# Patient Record
Sex: MALE | Race: WHITE | HISPANIC OR LATINO
[De-identification: names, ages, dates, MRNs, and addresses within clinical notes are randomized per-mention and may not be internally consistent; named-entity substitution may affect disease eponyms.]

---

## 2017-03-11 ENCOUNTER — TRANSCRIPTION ENCOUNTER (OUTPATIENT)
Age: 57
End: 2017-03-11

## 2017-03-12 ENCOUNTER — TRANSCRIPTION ENCOUNTER (OUTPATIENT)
Age: 57
End: 2017-03-12

## 2017-05-28 ENCOUNTER — TRANSCRIPTION ENCOUNTER (OUTPATIENT)
Age: 57
End: 2017-05-28

## 2017-10-06 ENCOUNTER — TRANSCRIPTION ENCOUNTER (OUTPATIENT)
Age: 57
End: 2017-10-06

## 2018-01-17 ENCOUNTER — TRANSCRIPTION ENCOUNTER (OUTPATIENT)
Age: 58
End: 2018-01-17

## 2019-11-21 ENCOUNTER — TRANSCRIPTION ENCOUNTER (OUTPATIENT)
Age: 59
End: 2019-11-21

## 2022-05-06 ENCOUNTER — NON-APPOINTMENT (OUTPATIENT)
Age: 62
End: 2022-05-06

## 2022-11-16 ENCOUNTER — OFFICE (OUTPATIENT)
Dept: URBAN - METROPOLITAN AREA CLINIC 109 | Facility: CLINIC | Age: 62
Setting detail: OPHTHALMOLOGY
End: 2022-11-16
Payer: COMMERCIAL

## 2022-11-16 DIAGNOSIS — H02.834: ICD-10-CM

## 2022-11-16 DIAGNOSIS — E11.9: ICD-10-CM

## 2022-11-16 DIAGNOSIS — H01.005: ICD-10-CM

## 2022-11-16 DIAGNOSIS — H01.004: ICD-10-CM

## 2022-11-16 DIAGNOSIS — H01.001: ICD-10-CM

## 2022-11-16 DIAGNOSIS — H02.832: ICD-10-CM

## 2022-11-16 DIAGNOSIS — H25.13: ICD-10-CM

## 2022-11-16 DIAGNOSIS — H01.002: ICD-10-CM

## 2022-11-16 DIAGNOSIS — H02.835: ICD-10-CM

## 2022-11-16 DIAGNOSIS — H40.033: ICD-10-CM

## 2022-11-16 DIAGNOSIS — H02.831: ICD-10-CM

## 2022-11-16 DIAGNOSIS — H16.222: ICD-10-CM

## 2022-11-16 PROCEDURE — 92020 GONIOSCOPY: CPT | Performed by: OPHTHALMOLOGY

## 2022-11-16 PROCEDURE — 92014 COMPRE OPH EXAM EST PT 1/>: CPT | Performed by: OPHTHALMOLOGY

## 2022-11-16 ASSESSMENT — TONOMETRY
OD_IOP_MMHG: 19
OS_IOP_MMHG: 18

## 2022-11-16 ASSESSMENT — REFRACTION_CURRENTRX
OD_CYLINDER: -0.25
OS_AXIS: 158
OS_CYLINDER: -0.25
OD_ADD: +2.25
OS_ADD: +2.25
OS_OVR_VA: 20/
OD_OVR_VA: 20/
OD_AXIS: 29
OD_SPHERE: +1.75
OS_VPRISM_DIRECTION: PROGS
OD_VPRISM_DIRECTION: PROGS
OS_SPHERE: +1.75

## 2022-11-16 ASSESSMENT — REFRACTION_AUTOREFRACTION
OS_SPHERE: +2.00
OS_AXIS: 174
OD_CYLINDER: -0.25
OD_AXIS: 151
OS_CYLINDER: -0.25
OD_SPHERE: +2.00

## 2022-11-16 ASSESSMENT — LID EXAM ASSESSMENTS
OD_DERMATOCHALASIS: 1+
OS_BLEPHARITIS: LLL LUL T
OS_DERMATOCHALASIS: 1+
OD_BLEPHARITIS: RLL RUL T

## 2022-11-16 ASSESSMENT — KERATOMETRY
OD_K2POWER_DIOPTERS: 43.00
OD_K1POWER_DIOPTERS: 42.50
OS_K1POWER_DIOPTERS: 42.75
OD_AXISANGLE_DEGREES: 073
OS_K2POWER_DIOPTERS: 43.25
OS_AXISANGLE_DEGREES: 093

## 2022-11-16 ASSESSMENT — SPHEQUIV_DERIVED
OD_SPHEQUIV: 1.875
OS_SPHEQUIV: 1.875

## 2022-11-16 ASSESSMENT — REFRACTION_MANIFEST
OD_SPHERE: +2.00
OS_SPHERE: +1.75
OD_ADD: +2.50
OS_ADD: +2.50
OS_VA1: 2020
OD_VA1: 20/20

## 2022-11-16 ASSESSMENT — VISUAL ACUITY
OD_BCVA: 20/20
OS_BCVA: 20/30

## 2022-11-16 ASSESSMENT — AXIALLENGTH_DERIVED
OS_AL: 23.0574
OD_AL: 23.1455

## 2022-11-16 ASSESSMENT — CONFRONTATIONAL VISUAL FIELD TEST (CVF)
OS_FINDINGS: FULL
OD_FINDINGS: FULL

## 2022-11-16 ASSESSMENT — SUPERFICIAL PUNCTATE KERATITIS (SPK): OS_SPK: 1+

## 2022-12-07 ENCOUNTER — OFFICE (OUTPATIENT)
Dept: URBAN - METROPOLITAN AREA CLINIC 109 | Facility: CLINIC | Age: 62
Setting detail: OPHTHALMOLOGY
End: 2022-12-07
Payer: COMMERCIAL

## 2022-12-07 DIAGNOSIS — H02.831: ICD-10-CM

## 2022-12-07 DIAGNOSIS — H01.004: ICD-10-CM

## 2022-12-07 DIAGNOSIS — H01.001: ICD-10-CM

## 2022-12-07 DIAGNOSIS — H02.832: ICD-10-CM

## 2022-12-07 DIAGNOSIS — H16.222: ICD-10-CM

## 2022-12-07 DIAGNOSIS — H25.13: ICD-10-CM

## 2022-12-07 DIAGNOSIS — H01.005: ICD-10-CM

## 2022-12-07 DIAGNOSIS — H01.002: ICD-10-CM

## 2022-12-07 DIAGNOSIS — E11.9: ICD-10-CM

## 2022-12-07 DIAGNOSIS — H40.033: ICD-10-CM

## 2022-12-07 DIAGNOSIS — H02.835: ICD-10-CM

## 2022-12-07 DIAGNOSIS — H02.834: ICD-10-CM

## 2022-12-07 PROBLEM — H57.12: Status: ACTIVE | Noted: 2022-11-16

## 2022-12-07 PROCEDURE — 99213 OFFICE O/P EST LOW 20 MIN: CPT | Performed by: OPHTHALMOLOGY

## 2022-12-07 ASSESSMENT — REFRACTION_AUTOREFRACTION
OS_AXIS: 174
OS_SPHERE: +2.00
OD_AXIS: 151
OS_CYLINDER: -0.25
OD_SPHERE: +2.00
OD_CYLINDER: -0.25

## 2022-12-07 ASSESSMENT — REFRACTION_MANIFEST
OS_SPHERE: +1.75
OD_VA1: 20/20
OD_SPHERE: +2.00
OD_ADD: +2.50
OS_VA1: 20/20
OS_ADD: +2.50

## 2022-12-07 ASSESSMENT — LID EXAM ASSESSMENTS
OD_BLEPHARITIS: RLL RUL T
OS_DERMATOCHALASIS: 1+
OD_DERMATOCHALASIS: 1+
OS_BLEPHARITIS: LLL LUL T

## 2022-12-07 ASSESSMENT — REFRACTION_CURRENTRX
OD_CYLINDER: -0.25
OS_VPRISM_DIRECTION: PROGS
OS_ADD: +2.25
OS_CYLINDER: -0.25
OS_OVR_VA: 20/
OD_VPRISM_DIRECTION: PROGS
OD_OVR_VA: 20/
OS_AXIS: 158
OD_ADD: +2.25
OD_SPHERE: +1.75
OD_AXIS: 29
OS_SPHERE: +1.75

## 2022-12-07 ASSESSMENT — KERATOMETRY
OD_K2POWER_DIOPTERS: 43.00
OS_K2POWER_DIOPTERS: 43.25
OS_K1POWER_DIOPTERS: 42.75
OS_AXISANGLE_DEGREES: 093
OD_AXISANGLE_DEGREES: 073
OD_K1POWER_DIOPTERS: 42.50

## 2022-12-07 ASSESSMENT — CONFRONTATIONAL VISUAL FIELD TEST (CVF)
OS_FINDINGS: FULL
OD_FINDINGS: FULL

## 2022-12-07 ASSESSMENT — SPHEQUIV_DERIVED
OD_SPHEQUIV: 1.875
OS_SPHEQUIV: 1.875

## 2022-12-07 ASSESSMENT — AXIALLENGTH_DERIVED
OD_AL: 23.1455
OS_AL: 23.0574

## 2022-12-07 ASSESSMENT — TONOMETRY
OD_IOP_MMHG: 17
OS_IOP_MMHG: 17

## 2022-12-07 ASSESSMENT — SUPERFICIAL PUNCTATE KERATITIS (SPK): OS_SPK: 1+

## 2022-12-07 ASSESSMENT — VISUAL ACUITY
OS_BCVA: 20/30
OD_BCVA: 20/20

## 2023-03-23 ENCOUNTER — NON-APPOINTMENT (OUTPATIENT)
Age: 63
End: 2023-03-23

## 2023-03-30 ENCOUNTER — NON-APPOINTMENT (OUTPATIENT)
Age: 63
End: 2023-03-30

## 2023-04-04 ENCOUNTER — APPOINTMENT (OUTPATIENT)
Dept: ORTHOPEDIC SURGERY | Facility: CLINIC | Age: 63
End: 2023-04-04
Payer: COMMERCIAL

## 2023-04-04 PROCEDURE — 73030 X-RAY EXAM OF SHOULDER: CPT | Mod: RT

## 2023-04-04 PROCEDURE — 99203 OFFICE O/P NEW LOW 30 MIN: CPT

## 2023-04-04 NOTE — PHYSICAL EXAM
[de-identified] : General Exam\par \par Well developed, well nourished\par No apparent distress\par Oriented to person, place, and time\par Mood: Normal\par Affect: Normal\par Balance and coordination: Normal\par Gait: Normal\par \par Right shoulder exam\par \par Inspection: No swelling, ecchymosis or gross deformity.\par Skin: No masses, No lesions\par Tenderness: No bicipital tenderness, no tenderness to the greater tuberosity/RTC insertion, no anterior shoulder/lesser tuberosity tenderness. No tenderness SC joint, clavicle, AC joint.\par ROM: 160/60/T6\par Impingement tests: Positive Rodríguez\par AC Joint: no pain with cross arm testing\par Biceps: Negative speed\par Strength: 5-/5 abduction, 5/5 external rotation, and internal rotation\par Neuro: AIN, PIN, Ulnar nerve motor intact\par Sensation: Intact to light touch in radial, median, ulnar, and axillary nerve distributions\par Vasc: 2+ radial pulse\par  [de-identified] : \par The following radiographs were ordered and read by me during this patients visit. I reviewed each radiograph in detail with the patient and discussed the findings as highlighted below. \par \par 3 views of the right shoulder were obtained today.  The glenohumeral joint is well-maintained.  There is normal alignment there is no fracture
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
## 2023-04-04 NOTE — DISCUSSION/SUMMARY
[de-identified] : Longstanding right shoulder pain and weakness.  Has had extensive conservative care he continues to experience pain and weakness.  An MRI in the past several years ago showed a rotator cuff tear.  He has tried physical therapy anti-inflammatory medications time activity modification without relief.  He has weakness with rotator cuff testing today.  We will obtain an MRI to further evaluate and he will follow-up after MRI

## 2023-04-27 ENCOUNTER — NON-APPOINTMENT (OUTPATIENT)
Age: 63
End: 2023-04-27

## 2023-05-05 ENCOUNTER — APPOINTMENT (OUTPATIENT)
Dept: ORTHOPEDIC SURGERY | Facility: CLINIC | Age: 63
End: 2023-05-05
Payer: COMMERCIAL

## 2023-05-05 VITALS — WEIGHT: 200 LBS | BODY MASS INDEX: 33.32 KG/M2 | HEIGHT: 65 IN

## 2023-05-05 PROCEDURE — 99214 OFFICE O/P EST MOD 30 MIN: CPT

## 2023-05-05 NOTE — PHYSICAL EXAM
[de-identified] : Right shoulder exam\par \par Inspection: No swelling, ecchymosis or gross deformity.\par Skin: No masses, No lesions\par Tenderness: No bicipital tenderness, no tenderness to the greater tuberosity/RTC insertion, no anterior shoulder/lesser tuberosity tenderness. No tenderness SC joint, clavicle, AC joint.\par ROM: 160/60/T6\par Impingement tests: Positive Rodríguez\par AC Joint: no pain with cross arm testing\par Biceps: Negative speed\par Strength: 5-/5 abduction, 5/5 external rotation, and 5-/5 internal rotation\par Neuro: AIN, PIN, Ulnar nerve motor intact\par Sensation: Intact to light touch in radial, median, ulnar, and axillary nerve distributions\par Vasc: 2+ radial pulse\par  [de-identified] : MRI reviewed right shoulder.  There is supraspinatus tendinosis with a calcific deposit in the infraspinatus.  There is ossification within the subscapularis with possible prior avulsion.  There is biceps tenosynovitis there is a large subacromial spur
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
## 2023-05-05 NOTE — DISCUSSION/SUMMARY
[de-identified] : 63-year-old male continued right shoulder pain and weakness pain is extensive nonoperative care he continues to experience pain and weakness.  We discussed continued nonoperative care with activity modification physical therapy medications injections.  We discussed surgical treatment.  Right shoulder arthroscopy subacromial decompression rotator cuff repair and possible biceps tenodesis.  Risks benefits alternatives discussed at length including but not limited to risks such as bleeding infection nerve or vessel injury continued pain stiffness retear need for future surgery medical complications such as DVT or PE and anesthesia complications.  All questions answered he will schedule at his convenience

## 2023-05-12 ENCOUNTER — TRANSCRIPTION ENCOUNTER (OUTPATIENT)
Age: 63
End: 2023-05-12

## 2023-05-12 ENCOUNTER — OUTPATIENT (OUTPATIENT)
Dept: OUTPATIENT SERVICES | Facility: HOSPITAL | Age: 63
LOS: 1 days | Discharge: HOME | End: 2023-05-12
Payer: COMMERCIAL

## 2023-05-12 VITALS
OXYGEN SATURATION: 98 % | TEMPERATURE: 98 F | HEIGHT: 65 IN | HEART RATE: 85 BPM | DIASTOLIC BLOOD PRESSURE: 69 MMHG | WEIGHT: 201.5 LBS | RESPIRATION RATE: 18 BRPM | SYSTOLIC BLOOD PRESSURE: 129 MMHG

## 2023-05-12 DIAGNOSIS — I10 ESSENTIAL (PRIMARY) HYPERTENSION: ICD-10-CM

## 2023-05-12 DIAGNOSIS — M75.121 COMPLETE ROTATOR CUFF TEAR OR RUPTURE OF RIGHT SHOULDER, NOT SPECIFIED AS TRAUMATIC: ICD-10-CM

## 2023-05-12 DIAGNOSIS — Z98.890 OTHER SPECIFIED POSTPROCEDURAL STATES: Chronic | ICD-10-CM

## 2023-05-12 DIAGNOSIS — Z98.52 VASECTOMY STATUS: Chronic | ICD-10-CM

## 2023-05-12 DIAGNOSIS — Z01.818 ENCOUNTER FOR OTHER PREPROCEDURAL EXAMINATION: ICD-10-CM

## 2023-05-12 DIAGNOSIS — E11.9 TYPE 2 DIABETES MELLITUS WITHOUT COMPLICATIONS: ICD-10-CM

## 2023-05-12 LAB
ANION GAP SERPL CALC-SCNC: 5 MMOL/L — SIGNIFICANT CHANGE UP (ref 5–17)
BASOPHILS # BLD AUTO: 0.01 K/UL — SIGNIFICANT CHANGE UP (ref 0–0.2)
BASOPHILS NFR BLD AUTO: 0.2 % — SIGNIFICANT CHANGE UP (ref 0–2)
BUN SERPL-MCNC: 17 MG/DL — SIGNIFICANT CHANGE UP (ref 7–23)
CALCIUM SERPL-MCNC: 9.4 MG/DL — SIGNIFICANT CHANGE UP (ref 8.5–10.1)
CHLORIDE SERPL-SCNC: 106 MMOL/L — SIGNIFICANT CHANGE UP (ref 96–108)
CO2 SERPL-SCNC: 26 MMOL/L — SIGNIFICANT CHANGE UP (ref 22–31)
CREAT SERPL-MCNC: 0.73 MG/DL — SIGNIFICANT CHANGE UP (ref 0.5–1.3)
EGFR: 104 ML/MIN/1.73M2 — SIGNIFICANT CHANGE UP
EOSINOPHIL # BLD AUTO: 0.02 K/UL — SIGNIFICANT CHANGE UP (ref 0–0.5)
EOSINOPHIL NFR BLD AUTO: 0.3 % — SIGNIFICANT CHANGE UP (ref 0–6)
GLUCOSE SERPL-MCNC: 193 MG/DL — HIGH (ref 70–99)
HCT VFR BLD CALC: 47.6 % — SIGNIFICANT CHANGE UP (ref 39–50)
HGB BLD-MCNC: 16.1 G/DL — SIGNIFICANT CHANGE UP (ref 13–17)
IMM GRANULOCYTES NFR BLD AUTO: 0.3 % — SIGNIFICANT CHANGE UP (ref 0–0.9)
LYMPHOCYTES # BLD AUTO: 2.5 K/UL — SIGNIFICANT CHANGE UP (ref 1–3.3)
LYMPHOCYTES # BLD AUTO: 37.9 % — SIGNIFICANT CHANGE UP (ref 13–44)
MCHC RBC-ENTMCNC: 31 PG — SIGNIFICANT CHANGE UP (ref 27–34)
MCHC RBC-ENTMCNC: 33.8 G/DL — SIGNIFICANT CHANGE UP (ref 32–36)
MCV RBC AUTO: 91.7 FL — SIGNIFICANT CHANGE UP (ref 80–100)
MONOCYTES # BLD AUTO: 0.47 K/UL — SIGNIFICANT CHANGE UP (ref 0–0.9)
MONOCYTES NFR BLD AUTO: 7.1 % — SIGNIFICANT CHANGE UP (ref 2–14)
NEUTROPHILS # BLD AUTO: 3.57 K/UL — SIGNIFICANT CHANGE UP (ref 1.8–7.4)
NEUTROPHILS NFR BLD AUTO: 54.2 % — SIGNIFICANT CHANGE UP (ref 43–77)
NRBC # BLD: 0 /100 WBCS — SIGNIFICANT CHANGE UP (ref 0–0)
PLATELET # BLD AUTO: 201 K/UL — SIGNIFICANT CHANGE UP (ref 150–400)
POTASSIUM SERPL-MCNC: 4.3 MMOL/L — SIGNIFICANT CHANGE UP (ref 3.5–5.3)
POTASSIUM SERPL-SCNC: 4.3 MMOL/L — SIGNIFICANT CHANGE UP (ref 3.5–5.3)
RBC # BLD: 5.19 M/UL — SIGNIFICANT CHANGE UP (ref 4.2–5.8)
RBC # FLD: 11.9 % — SIGNIFICANT CHANGE UP (ref 10.3–14.5)
SODIUM SERPL-SCNC: 137 MMOL/L — SIGNIFICANT CHANGE UP (ref 135–145)
WBC # BLD: 6.59 K/UL — SIGNIFICANT CHANGE UP (ref 3.8–10.5)
WBC # FLD AUTO: 6.59 K/UL — SIGNIFICANT CHANGE UP (ref 3.8–10.5)

## 2023-05-12 PROCEDURE — 93010 ELECTROCARDIOGRAM REPORT: CPT

## 2023-05-12 NOTE — H&P PST ADULT - HISTORY OF PRESENT ILLNESS
62yo male with medical h/o T2DM and HTN, reports Shoulder pain r/t rotator cuff tear. Pt presents today for PST for scheduled Right Shoulder Arthroscopy on 5/22/2023

## 2023-05-12 NOTE — H&P PST ADULT - PROBLEM SELECTOR PLAN 1
Pre-op instructions given. Pt verbalized understanding  Chlorhexidine wash instructions given  Pt instructed to hold all NSAIDs + herbal supplements/vitamins 7 days before surgery  HOLD all diabetic meds + Accu-Chek ordered STAT for day before surgery  Pending: Labs + M/C for abnormal ecg

## 2023-05-12 NOTE — H&P PST ADULT - NSANTHOSAYNRD_GEN_A_CORE
No. KAMRON screening performed.  STOP BANG Legend: 0-2 = LOW Risk; 3-4 = INTERMEDIATE Risk; 5-8 = HIGH Risk

## 2023-05-12 NOTE — H&P PST ADULT - PROBLEM SELECTOR PLAN 3
HOLD all diabetic meds + Accu-Chek ordered STAT for day before surgery  STOP Jardiance 5/17/2023, restart after surgery

## 2023-05-12 NOTE — H&P PST ADULT - NEGATIVE MUSCULOSKELETAL SYMPTOMS
no arthritis/no joint swelling/no myalgia/no muscle cramps/no muscle weakness/no neck pain/no arm pain L/no back pain/no leg pain L/no leg pain R

## 2023-05-12 NOTE — H&P PST ADULT - PAIN GOAL
Presented with acute onset of lower back pain after physical activity 3 days ago, denies any fall/trauma, injury, heavy lifting etc   Pain gradually worsened over the last 3 days with difficulty in ambulation  Denies any radiation, tingling, numbness, weakness, bladder or bowel dysfunction  Reports prior history of mild intermittent back discomfort but did not have similar symptoms in the past   CT chest abdomen lumbar spine with evidence of degenerative changes without any acute abnormality    Reports significant improvement in pain after receiving meds in ED  · Continue pain control  · PT/OT  · Activity as tolerated  · Monitor signs and symptoms  · Will refer to spine/pain management as outpatient if persistent/recurrent 2

## 2023-05-21 ENCOUNTER — TRANSCRIPTION ENCOUNTER (OUTPATIENT)
Age: 63
End: 2023-05-21

## 2023-05-22 ENCOUNTER — OUTPATIENT (OUTPATIENT)
Dept: OUTPATIENT SERVICES | Facility: HOSPITAL | Age: 63
LOS: 1 days | Discharge: HOME | End: 2023-05-22
Payer: COMMERCIAL

## 2023-05-22 ENCOUNTER — APPOINTMENT (OUTPATIENT)
Dept: ORTHOPEDIC SURGERY | Facility: HOSPITAL | Age: 63
End: 2023-05-22

## 2023-05-22 ENCOUNTER — TRANSCRIPTION ENCOUNTER (OUTPATIENT)
Age: 63
End: 2023-05-22

## 2023-05-22 VITALS
OXYGEN SATURATION: 97 % | HEART RATE: 85 BPM | DIASTOLIC BLOOD PRESSURE: 72 MMHG | SYSTOLIC BLOOD PRESSURE: 128 MMHG | RESPIRATION RATE: 17 BRPM

## 2023-05-22 VITALS
RESPIRATION RATE: 14 BRPM | HEART RATE: 78 BPM | DIASTOLIC BLOOD PRESSURE: 75 MMHG | SYSTOLIC BLOOD PRESSURE: 115 MMHG | TEMPERATURE: 99 F | OXYGEN SATURATION: 98 %

## 2023-05-22 DIAGNOSIS — Z98.890 OTHER SPECIFIED POSTPROCEDURAL STATES: Chronic | ICD-10-CM

## 2023-05-22 DIAGNOSIS — Z98.52 VASECTOMY STATUS: Chronic | ICD-10-CM

## 2023-05-22 LAB
GLUCOSE BLDC GLUCOMTR-MCNC: 167 MG/DL — HIGH (ref 70–99)
GLUCOSE BLDC GLUCOMTR-MCNC: 170 MG/DL — HIGH (ref 70–99)

## 2023-05-22 PROCEDURE — 23430 REPAIR BICEPS TENDON: CPT | Mod: RT

## 2023-05-22 PROCEDURE — 29826 SHO ARTHRS SRG DECOMPRESSION: CPT | Mod: RT

## 2023-05-22 PROCEDURE — 29827 SHO ARTHRS SRG RT8TR CUF RPR: CPT | Mod: RT

## 2023-05-22 DEVICE — KIT IMP SYS PROX TENODESIS: Type: IMPLANTABLE DEVICE | Site: RIGHT | Status: FUNCTIONAL

## 2023-05-22 DEVICE — ANCHOR BIO-COMP SWIVLCK DBL LOADED 4.75X22MM MIN ORDER 5: Type: IMPLANTABLE DEVICE | Site: RIGHT | Status: FUNCTIONAL

## 2023-05-22 RX ORDER — ONDANSETRON 8 MG/1
1 TABLET, FILM COATED ORAL
Qty: 21 | Refills: 0
Start: 2023-05-22 | End: 2023-05-28

## 2023-05-22 RX ORDER — ACETAMINOPHEN 500 MG
2 TABLET ORAL
Qty: 60 | Refills: 0
Start: 2023-05-22 | End: 2023-05-31

## 2023-05-22 RX ORDER — DOCUSATE SODIUM 100 MG
1 CAPSULE ORAL
Qty: 14 | Refills: 0
Start: 2023-05-22 | End: 2023-05-28

## 2023-05-22 RX ORDER — METFORMIN HYDROCHLORIDE 850 MG/1
1 TABLET ORAL
Refills: 0 | DISCHARGE

## 2023-05-22 RX ORDER — SEMAGLUTIDE 0.68 MG/ML
1 INJECTION, SOLUTION SUBCUTANEOUS
Refills: 0 | DISCHARGE

## 2023-05-22 RX ORDER — SODIUM CHLORIDE 9 MG/ML
1000 INJECTION, SOLUTION INTRAVENOUS
Refills: 0 | Status: DISCONTINUED | OUTPATIENT
Start: 2023-05-22 | End: 2023-05-22

## 2023-05-22 RX ORDER — ONDANSETRON 8 MG/1
4 TABLET, FILM COATED ORAL ONCE
Refills: 0 | Status: DISCONTINUED | OUTPATIENT
Start: 2023-05-22 | End: 2023-05-22

## 2023-05-22 RX ORDER — HYDROMORPHONE HYDROCHLORIDE 2 MG/ML
0.5 INJECTION INTRAMUSCULAR; INTRAVENOUS; SUBCUTANEOUS
Refills: 0 | Status: DISCONTINUED | OUTPATIENT
Start: 2023-05-22 | End: 2023-05-22

## 2023-05-22 RX ORDER — EMPAGLIFLOZIN 10 MG/1
1 TABLET, FILM COATED ORAL
Refills: 0 | DISCHARGE

## 2023-05-22 RX ORDER — ASPIRIN/CALCIUM CARB/MAGNESIUM 324 MG
0 TABLET ORAL
Refills: 0 | DISCHARGE

## 2023-05-22 RX ORDER — OXYCODONE HYDROCHLORIDE 5 MG/1
1 TABLET ORAL
Qty: 20 | Refills: 0
Start: 2023-05-22 | End: 2023-05-26

## 2023-05-22 RX ORDER — LISINOPRIL 2.5 MG/1
1 TABLET ORAL
Refills: 0 | DISCHARGE

## 2023-05-22 RX ORDER — SODIUM CHLORIDE 9 MG/ML
3 INJECTION INTRAMUSCULAR; INTRAVENOUS; SUBCUTANEOUS EVERY 8 HOURS
Refills: 0 | Status: DISCONTINUED | OUTPATIENT
Start: 2023-05-22 | End: 2023-05-22

## 2023-05-22 RX ORDER — HYDROMORPHONE HYDROCHLORIDE 2 MG/ML
1 INJECTION INTRAMUSCULAR; INTRAVENOUS; SUBCUTANEOUS
Refills: 0 | Status: DISCONTINUED | OUTPATIENT
Start: 2023-05-22 | End: 2023-05-22

## 2023-05-22 NOTE — ASU DISCHARGE PLAN (ADULT/PEDIATRIC) - NS MD DC FALL RISK RISK
For information on Fall & Injury Prevention, visit: [email or web address] OR  [email or web address] OR  https://www.cdc.gov/steadi/patient.html

## 2023-05-22 NOTE — ASU PATIENT PROFILE, ADULT - FALL HARM RISK - HARM RISK INTERVENTIONS

## 2023-05-22 NOTE — ASU DISCHARGE PLAN (ADULT/PEDIATRIC) - PROCEDURE
right rotator cuff tear, arthroscopic repair with subacromial decompression/acromioplasty, open biceps tenodesis

## 2023-05-22 NOTE — BRIEF OPERATIVE NOTE - OPERATION/FINDINGS
right rotator cuff tear, arthroscopic repair with subacromial decompression/acromioplasty, biceps tenodesis open

## 2023-05-22 NOTE — ASU DISCHARGE PLAN (ADULT/PEDIATRIC) - CARE PROVIDER_API CALL
Gian Parrish)  Orthopaedic Surgery  [address], [address]  [place] [address]  Phone: [phone]  Fax: [phone]  Follow Up Time:

## 2023-05-22 NOTE — BRIEF OPERATIVE NOTE - NSICDXBRIEFPROCEDURE_GEN_ALL_CORE_FT
PROCEDURES:  Repair, rotator cuff, arthroscopic 22-May-2023 15:33:36 right, with biceps tenodesis and acromioplasty Ken Galvez

## 2023-05-22 NOTE — BRIEF OPERATIVE NOTE - NSICDXBRIEFPOSTOP_GEN_ALL_CORE_FT
POST-OP DIAGNOSIS:  Rotator cuff tear 22-May-2023 15:35:25 right, with slap tear and subacromial impingement Ken Galvez

## 2023-05-22 NOTE — BRIEF OPERATIVE NOTE - NSICDXBRIEFPREOP_GEN_ALL_CORE_FT
PRE-OP DIAGNOSIS:  Rotator cuff tear 22-May-2023 15:34:48 right, with slap tear and subacromial impingement Ken Galvez

## 2023-05-25 DIAGNOSIS — M77.8 OTHER ENTHESOPATHIES, NOT ELSEWHERE CLASSIFIED: ICD-10-CM

## 2023-05-25 DIAGNOSIS — Z79.84 LONG TERM (CURRENT) USE OF ORAL HYPOGLYCEMIC DRUGS: ICD-10-CM

## 2023-05-25 DIAGNOSIS — E11.9 TYPE 2 DIABETES MELLITUS WITHOUT COMPLICATIONS: ICD-10-CM

## 2023-05-25 DIAGNOSIS — M75.121 COMPLETE ROTATOR CUFF TEAR OR RUPTURE OF RIGHT SHOULDER, NOT SPECIFIED AS TRAUMATIC: ICD-10-CM

## 2023-05-25 DIAGNOSIS — M24.111 OTHER ARTICULAR CARTILAGE DISORDERS, RIGHT SHOULDER: ICD-10-CM

## 2023-05-25 DIAGNOSIS — M75.41 IMPINGEMENT SYNDROME OF RIGHT SHOULDER: ICD-10-CM

## 2023-05-25 DIAGNOSIS — Z79.82 LONG TERM (CURRENT) USE OF ASPIRIN: ICD-10-CM

## 2023-05-25 DIAGNOSIS — I10 ESSENTIAL (PRIMARY) HYPERTENSION: ICD-10-CM

## 2023-06-06 ENCOUNTER — APPOINTMENT (OUTPATIENT)
Dept: ORTHOPEDIC SURGERY | Facility: CLINIC | Age: 63
End: 2023-06-06
Payer: COMMERCIAL

## 2023-06-06 PROBLEM — E11.9 TYPE 2 DIABETES MELLITUS WITHOUT COMPLICATIONS: Chronic | Status: ACTIVE | Noted: 2023-05-12

## 2023-06-06 PROBLEM — I10 ESSENTIAL (PRIMARY) HYPERTENSION: Chronic | Status: ACTIVE | Noted: 2023-05-12

## 2023-06-06 PROBLEM — M25.511 PAIN IN RIGHT SHOULDER: Chronic | Status: ACTIVE | Noted: 2023-05-12

## 2023-06-06 PROCEDURE — 99213 OFFICE O/P EST LOW 20 MIN: CPT | Mod: 24

## 2023-06-06 PROCEDURE — 99024 POSTOP FOLLOW-UP VISIT: CPT

## 2023-06-06 PROCEDURE — 73030 X-RAY EXAM OF SHOULDER: CPT | Mod: LT

## 2023-06-06 NOTE — HISTORY OF PRESENT ILLNESS
[de-identified] : R shoulder [de-identified] : 62yo M s/p Right shoulder arthroscopy, rotator cuff repair, subacromial decompression, open subpectoral biceps tenodesis, 5/22/23.  He is doing very well in terms of right shoulder he is wearing his sling he reports no pain.  He presents today because of chronic left shoulder pain.  He reports left shoulder pain for many years.  He was told he had an MRI in the past with labral tear.  His pain has become worsened as he is been focusing on his left shoulder with his right arm in a sling he has pain on a daily basis worse at nighttime he denies numbness and tingling [de-identified] : right shoulder exam.\par inc healed well. no erythema\par no pain gentle passive rom\par able to flex/ext all fingers\par silt r/u/m/ax\par bcr\par \par Left shoulder exam\par \par ·	Inspection: No swelling, ecchymosis or gross deformity.\par ·	Skin: No masses, No lesions\par ·	Neck: Negative Spurlings, full ROM without pain\par ·	Tenderness: No bicipital tenderness, no tenderness to the greater tuberosity/RTC insertion, no anterior shoulder/lesser tuberosity tenderness. No tenderness SC joint, clavicle, AC joint.\par ·	ROM: Forward elevation 160 degrees external rotation 60 degrees internal rotation T6\par ·	Impingement tests: Positive Rodríguez, Negative Neer, no painful arc\par ·	AC Joint: no pain with cross arm testing\par ·	Biceps: Negative Speed\par ·	Strength: 5/5 abduction, external rotation, and internal rotation\par ·	Neuro: AIN, PIN, Ulnar nerve motor intact\par ·	Sensation: Intact to light touch in radial, median, ulnar, and axillary nerve distributions\par ·	Vasc: 2+ radial pulse\par \par  [de-identified] : \par The following radiographs were ordered and read by me during this patients visit. I reviewed each radiograph in detail with the patient and discussed the findings as highlighted below. \par 3 views left shoulder obtained today.  The glenohumeral joint is well-maintained.  There is acromioclavicular arthritis. [de-identified] : 62yo M s/p Right shoulder arthroscopy, rotator cuff repair, subacromial decompression, open subpectoral biceps tenodesis, 5/22/23.  He is doing well in terms of his right shoulder.  He is started physical therapy he is in a sling.  His main issue today is with his left shoulder for which she reports chronic longstanding pain.  Recommend anti-inflammatory medications he may do physical therapy in both of his shoulders.  He has routine follow-up scheduled in 1 month for his right shoulder.  If his left shoulder pain persist we can consider an MRI.  All questions were answered

## 2023-07-05 ENCOUNTER — APPOINTMENT (OUTPATIENT)
Dept: ORTHOPEDIC SURGERY | Facility: CLINIC | Age: 63
End: 2023-07-05
Payer: COMMERCIAL

## 2023-07-05 VITALS — HEIGHT: 65 IN | BODY MASS INDEX: 33.32 KG/M2 | WEIGHT: 200 LBS

## 2023-07-05 PROCEDURE — 99024 POSTOP FOLLOW-UP VISIT: CPT

## 2023-08-15 ENCOUNTER — APPOINTMENT (OUTPATIENT)
Dept: ORTHOPEDIC SURGERY | Facility: CLINIC | Age: 63
End: 2023-08-15
Payer: COMMERCIAL

## 2023-08-15 DIAGNOSIS — G89.29 PAIN IN LEFT SHOULDER: ICD-10-CM

## 2023-08-15 DIAGNOSIS — M25.512 PAIN IN LEFT SHOULDER: ICD-10-CM

## 2023-08-15 PROCEDURE — 99024 POSTOP FOLLOW-UP VISIT: CPT

## 2023-08-15 PROCEDURE — 99213 OFFICE O/P EST LOW 20 MIN: CPT | Mod: 24

## 2023-08-15 NOTE — HISTORY OF PRESENT ILLNESS
[de-identified] : \par  The following radiographs were ordered and read by me during this patients visit. I reviewed each radiograph in detail with the patient and discussed the findings as highlighted below. \par  3 views left shoulder obtained today.  The glenohumeral joint is well-maintained.  There is acromioclavicular arthritis. [de-identified] : R shoulder [de-identified] : 62yo M now three months s/p Right shoulder arthroscopy, rotator cuff repair, subacromial decompression, open subpectoral biceps tenodesis, 5/22/23.  He is doing very well in terms of right shoulder he is wearing his sling he reports no pain.  He presents today because of chronic left shoulder pain.  He reports left shoulder pain for many years.  He was told he had an MRI in the past with labral tear.  His pain has become worsened as he is been focusing on his left shoulder. he has pain on a daily basis worse at nighttime he denies numbness and tingling.  He is continuing to c/o L shoulder pain [de-identified] : right shoulder exam. inc healed well. no erythema no pain gentle passive rom.  Active forward elevation 140 degrees external rotation 40 degrees able to maintain active abduction able to flex/ext all fingers silt r/u/m/ax bcr  Left shoulder exam  	Inspection: No swelling, ecchymosis or gross deformity. 	Skin: No masses, No lesions 	Neck: Negative Spurlings, full ROM without pain 	Tenderness: No bicipital tenderness, no tenderness to the greater tuberosity/RTC insertion, no anterior shoulder/lesser tuberosity tenderness. No tenderness SC joint, clavicle, AC joint. 	ROM: Forward elevation 160 degrees external rotation 60 degrees internal rotation T6 	Impingement tests: Positive Rodríguez, Negative Neer, no painful arc 	AC Joint: no pain with cross arm testing 	Biceps: Negative Speed 	Strength: 5/5 abduction, external rotation, and internal rotation 	Neuro: AIN, PIN, Ulnar nerve motor intact 	Sensation: Intact to light touch in radial, median, ulnar, and axillary nerve distributions 	Vasc: 2+ radial pulse   [de-identified] : 62yo M three months s/p Right shoulder arthroscopy, rotator cuff repair, subacromial decompression, open subpectoral biceps tenodesis, 5/22/23.  He is doing well in terms of his right shoulder.   [de-identified] : He continues physical therapy.   His main issue today is with his left shoulder for which she reports chronic longstanding pain.  Recommend anti-inflammatory medications he may do physical therapy in both of his shoulders.  He has routine follow-up scheduled in 2 months for his right shoulder.  In terms of his left shoulder he has persistent pain this has not responded to physical therapy exercises anti-inflammatory medications physician directed over the for over 6 weeks in the last 3 months and MRIs indicated at this time he will follow-up after his MRI

## 2023-08-22 ENCOUNTER — NON-APPOINTMENT (OUTPATIENT)
Age: 63
End: 2023-08-22

## 2023-09-16 ENCOUNTER — NON-APPOINTMENT (OUTPATIENT)
Age: 63
End: 2023-09-16

## 2023-10-24 ENCOUNTER — APPOINTMENT (OUTPATIENT)
Dept: ORTHOPEDIC SURGERY | Facility: CLINIC | Age: 63
End: 2023-10-24
Payer: COMMERCIAL

## 2023-10-24 DIAGNOSIS — Z00.00 ENCOUNTER FOR GENERAL ADULT MEDICAL EXAMINATION W/OUT ABNORMAL FINDINGS: ICD-10-CM

## 2023-10-24 DIAGNOSIS — M75.121 COMPLETE ROTATOR CUFF TEAR OR RUPTURE OF RIGHT SHOULDER, NOT SPECIFIED AS TRAUMATIC: ICD-10-CM

## 2023-10-24 PROCEDURE — 20610 DRAIN/INJ JOINT/BURSA W/O US: CPT | Mod: LT

## 2023-10-24 PROCEDURE — 99214 OFFICE O/P EST MOD 30 MIN: CPT | Mod: 25

## 2023-11-03 ENCOUNTER — APPOINTMENT (OUTPATIENT)
Dept: ORTHOPEDIC SURGERY | Facility: CLINIC | Age: 63
End: 2023-11-03
Payer: COMMERCIAL

## 2023-11-03 VITALS — WEIGHT: 199 LBS | HEIGHT: 65 IN | BODY MASS INDEX: 33.15 KG/M2

## 2023-11-03 DIAGNOSIS — M19.042 PRIMARY OSTEOARTHRITIS, LEFT HAND: ICD-10-CM

## 2023-11-03 DIAGNOSIS — M19.049 PRIMARY OSTEOARTHRITIS, UNSPECIFIED HAND: ICD-10-CM

## 2023-11-03 PROCEDURE — 99204 OFFICE O/P NEW MOD 45 MIN: CPT

## 2023-11-03 RX ORDER — MELOXICAM 15 MG/1
15 TABLET ORAL DAILY
Qty: 30 | Refills: 0 | Status: ACTIVE | COMMUNITY
Start: 2023-11-03 | End: 1900-01-01

## 2023-11-03 RX ORDER — MELOXICAM 15 MG/1
15 TABLET ORAL DAILY
Qty: 30 | Refills: 3 | Status: ACTIVE | COMMUNITY
Start: 2023-11-03 | End: 1900-01-01

## 2023-11-08 ENCOUNTER — OFFICE (OUTPATIENT)
Dept: URBAN - METROPOLITAN AREA CLINIC 109 | Facility: CLINIC | Age: 63
Setting detail: OPHTHALMOLOGY
End: 2023-11-08
Payer: COMMERCIAL

## 2023-11-08 DIAGNOSIS — H02.831: ICD-10-CM

## 2023-11-08 DIAGNOSIS — H02.834: ICD-10-CM

## 2023-11-08 DIAGNOSIS — H02.832: ICD-10-CM

## 2023-11-08 DIAGNOSIS — H02.835: ICD-10-CM

## 2023-11-08 DIAGNOSIS — H00.024: ICD-10-CM

## 2023-11-08 PROCEDURE — 99213 OFFICE O/P EST LOW 20 MIN: CPT | Performed by: OPHTHALMOLOGY

## 2023-11-08 ASSESSMENT — REFRACTION_MANIFEST
OD_SPHERE: +2.00
OS_ADD: +2.50
OS_VA1: 20/20
OD_VA1: 20/20
OS_SPHERE: +1.75
OD_ADD: +2.50

## 2023-11-08 ASSESSMENT — LID EXAM ASSESSMENTS
OS_DERMATOCHALASIS: 1+
OD_BLEPHARITIS: RLL RUL T
OD_DERMATOCHALASIS: 1+
OS_BLEPHARITIS: LLL LUL T

## 2023-11-08 ASSESSMENT — REFRACTION_CURRENTRX
OS_VPRISM_DIRECTION: PROGS
OS_SPHERE: +1.75
OD_AXIS: 29
OD_VPRISM_DIRECTION: PROGS
OD_SPHERE: +1.75
OS_CYLINDER: -0.25
OD_OVR_VA: 20/
OS_ADD: +2.25
OD_ADD: +2.25
OD_CYLINDER: -0.25
OS_OVR_VA: 20/
OS_AXIS: 158

## 2023-11-08 ASSESSMENT — SUPERFICIAL PUNCTATE KERATITIS (SPK): OS_SPK: 1+

## 2023-11-08 ASSESSMENT — REFRACTION_AUTOREFRACTION
OS_SPHERE: +2.00
OD_CYLINDER: -0.25
OD_SPHERE: +2.00
OD_AXIS: 151
OS_AXIS: 174
OS_CYLINDER: -0.25

## 2023-11-08 ASSESSMENT — SPHEQUIV_DERIVED
OD_SPHEQUIV: 1.875
OS_SPHEQUIV: 1.875

## 2023-11-08 ASSESSMENT — CONFRONTATIONAL VISUAL FIELD TEST (CVF)
OD_FINDINGS: FULL
OS_FINDINGS: FULL

## 2023-12-10 ENCOUNTER — NON-APPOINTMENT (OUTPATIENT)
Age: 63
End: 2023-12-10

## 2023-12-19 ENCOUNTER — OFFICE (OUTPATIENT)
Dept: URBAN - METROPOLITAN AREA CLINIC 109 | Facility: CLINIC | Age: 63
Setting detail: OPHTHALMOLOGY
End: 2023-12-19
Payer: COMMERCIAL

## 2023-12-19 DIAGNOSIS — H16.222: ICD-10-CM

## 2023-12-19 DIAGNOSIS — E11.9: ICD-10-CM

## 2023-12-19 DIAGNOSIS — H02.831: ICD-10-CM

## 2023-12-19 DIAGNOSIS — H01.005: ICD-10-CM

## 2023-12-19 DIAGNOSIS — H25.13: ICD-10-CM

## 2023-12-19 DIAGNOSIS — H52.7: ICD-10-CM

## 2023-12-19 DIAGNOSIS — H01.001: ICD-10-CM

## 2023-12-19 DIAGNOSIS — H01.004: ICD-10-CM

## 2023-12-19 DIAGNOSIS — H02.832: ICD-10-CM

## 2023-12-19 DIAGNOSIS — H01.002: ICD-10-CM

## 2023-12-19 DIAGNOSIS — H40.033: ICD-10-CM

## 2023-12-19 DIAGNOSIS — Z79.84: ICD-10-CM

## 2023-12-19 PROBLEM — H02.834 DERMATOCHALASIS; RIGHT UPPER LID, RIGHT LOWER LID, LEFT UPPER LID, LEFT LOWER LID: Status: ACTIVE | Noted: 2023-12-19

## 2023-12-19 PROBLEM — H02.835 DERMATOCHALASIS; RIGHT UPPER LID, RIGHT LOWER LID, LEFT UPPER LID, LEFT LOWER LID: Status: ACTIVE | Noted: 2023-12-19

## 2023-12-19 PROCEDURE — 92020 GONIOSCOPY: CPT | Performed by: OPHTHALMOLOGY

## 2023-12-19 PROCEDURE — 92015 DETERMINE REFRACTIVE STATE: CPT | Performed by: OPHTHALMOLOGY

## 2023-12-19 PROCEDURE — 92014 COMPRE OPH EXAM EST PT 1/>: CPT | Performed by: OPHTHALMOLOGY

## 2023-12-19 ASSESSMENT — CONFRONTATIONAL VISUAL FIELD TEST (CVF)
OD_FINDINGS: FULL
OS_FINDINGS: FULL

## 2023-12-19 ASSESSMENT — REFRACTION_MANIFEST
OS_VA1: 20/20
OS_ADD: +2.50
OD_ADD: +2.50
OD_VA1: 20/20
OD_SPHERE: +2.25
OS_SPHERE: +1.75

## 2023-12-19 ASSESSMENT — REFRACTION_AUTOREFRACTION
OD_SPHERE: +2.00
OS_AXIS: 174
OD_CYLINDER: -0.25
OS_CYLINDER: -0.25
OD_AXIS: 151
OS_SPHERE: +2.00

## 2023-12-19 ASSESSMENT — REFRACTION_CURRENTRX
OD_AXIS: 29
OS_ADD: +2.25
OS_SPHERE: +1.75
OD_CYLINDER: -0.25
OS_CYLINDER: -0.25
OS_VPRISM_DIRECTION: PROGS
OD_VPRISM_DIRECTION: PROGS
OD_ADD: +2.25
OD_SPHERE: +1.75
OD_OVR_VA: 20/
OS_AXIS: 158
OS_OVR_VA: 20/

## 2023-12-19 ASSESSMENT — LID EXAM ASSESSMENTS
OD_DERMATOCHALASIS: 1+
OD_BLEPHARITIS: RLL RUL T
OS_BLEPHARITIS: LLL LUL T
OS_DERMATOCHALASIS: 1+

## 2023-12-19 ASSESSMENT — SUPERFICIAL PUNCTATE KERATITIS (SPK): OS_SPK: 1+

## 2023-12-19 ASSESSMENT — SPHEQUIV_DERIVED
OD_SPHEQUIV: 1.875
OS_SPHEQUIV: 1.875

## 2024-08-22 ENCOUNTER — OFFICE (OUTPATIENT)
Dept: URBAN - METROPOLITAN AREA CLINIC 63 | Facility: CLINIC | Age: 64
Setting detail: OPHTHALMOLOGY
End: 2024-08-22
Payer: COMMERCIAL

## 2024-08-22 DIAGNOSIS — H01.005: ICD-10-CM

## 2024-08-22 DIAGNOSIS — H01.001: ICD-10-CM

## 2024-08-22 DIAGNOSIS — H01.004: ICD-10-CM

## 2024-08-22 DIAGNOSIS — L03.213: ICD-10-CM

## 2024-08-22 DIAGNOSIS — H16.222: ICD-10-CM

## 2024-08-22 DIAGNOSIS — H01.002: ICD-10-CM

## 2024-08-22 PROCEDURE — 92012 INTRM OPH EXAM EST PATIENT: CPT | Performed by: OPHTHALMOLOGY

## 2024-08-22 ASSESSMENT — LID EXAM ASSESSMENTS
OD_DERMATOCHALASIS: 1+
OS_DERMATOCHALASIS: 1+
OD_BLEPHARITIS: RLL RUL 2+
OS_BLEPHARITIS: LLL LUL 2+
OD_MEIBOMITIS: RLL RUL 2+
OS_MEIBOMITIS: LLL LUL 2+

## 2024-08-22 ASSESSMENT — LID POSITION - DERMATOCHALASIS
OS_DERMATOCHALASIS: LUL
OD_DERMATOCHALASIS: RUL

## 2024-08-22 ASSESSMENT — CONFRONTATIONAL VISUAL FIELD TEST (CVF)
OD_FINDINGS: FULL
OS_FINDINGS: FULL

## 2024-12-19 ENCOUNTER — OFFICE (OUTPATIENT)
Dept: URBAN - METROPOLITAN AREA CLINIC 109 | Facility: CLINIC | Age: 64
Setting detail: OPHTHALMOLOGY
End: 2024-12-19
Payer: COMMERCIAL

## 2024-12-19 ENCOUNTER — RX ONLY (RX ONLY)
Age: 64
End: 2024-12-19

## 2024-12-19 DIAGNOSIS — H02.835: ICD-10-CM

## 2024-12-19 DIAGNOSIS — H40.033: ICD-10-CM

## 2024-12-19 DIAGNOSIS — H02.831: ICD-10-CM

## 2024-12-19 DIAGNOSIS — H02.834: ICD-10-CM

## 2024-12-19 DIAGNOSIS — E11.9: ICD-10-CM

## 2024-12-19 DIAGNOSIS — H01.002: ICD-10-CM

## 2024-12-19 DIAGNOSIS — H01.004: ICD-10-CM

## 2024-12-19 DIAGNOSIS — H02.832: ICD-10-CM

## 2024-12-19 DIAGNOSIS — H01.005: ICD-10-CM

## 2024-12-19 DIAGNOSIS — H01.001: ICD-10-CM

## 2024-12-19 DIAGNOSIS — H25.13: ICD-10-CM

## 2024-12-19 DIAGNOSIS — H16.222: ICD-10-CM

## 2024-12-19 PROCEDURE — 92020 GONIOSCOPY: CPT | Performed by: OPHTHALMOLOGY

## 2024-12-19 PROCEDURE — 92014 COMPRE OPH EXAM EST PT 1/>: CPT | Performed by: OPHTHALMOLOGY

## 2024-12-19 ASSESSMENT — KERATOMETRY
OD_K1POWER_DIOPTERS: 42.50
OS_AXISANGLE_DEGREES: 093
OD_AXISANGLE_DEGREES: 073
OD_K2POWER_DIOPTERS: 43.00
OS_K2POWER_DIOPTERS: 43.25
OS_K1POWER_DIOPTERS: 42.75

## 2024-12-19 ASSESSMENT — REFRACTION_AUTOREFRACTION
OS_SPHERE: +2.25
OD_CYLINDER: -0.50
OD_SPHERE: +2.75
OS_AXIS: 125
OD_AXIS: 105
OS_CYLINDER: -0.25

## 2024-12-19 ASSESSMENT — REFRACTION_CURRENTRX
OD_VPRISM_DIRECTION: PROGS
OD_ADD: +2.25
OS_ADD: +2.25
OD_SPHERE: +2.25
OS_VPRISM_DIRECTION: PROGS
OS_OVR_VA: 20/
OS_SPHERE: +1.75
OD_OVR_VA: 20/

## 2024-12-19 ASSESSMENT — SUPERFICIAL PUNCTATE KERATITIS (SPK): OS_SPK: 1+

## 2024-12-19 ASSESSMENT — CONFRONTATIONAL VISUAL FIELD TEST (CVF)
OD_FINDINGS: FULL
OS_FINDINGS: FULL

## 2024-12-19 ASSESSMENT — LID EXAM ASSESSMENTS
OD_BLEPHARITIS: RLL RUL 2+
OS_MEIBOMITIS: LLL LUL 2+
OD_DERMATOCHALASIS: 1+
OS_DERMATOCHALASIS: 1+
OD_MEIBOMITIS: RLL RUL 2+
OS_BLEPHARITIS: LLL LUL 2+

## 2024-12-19 ASSESSMENT — REFRACTION_MANIFEST
OD_SPHERE: +2.25
OS_ADD: +2.50
OD_ADD: +2.50
OS_SPHERE: +1.75
OD_VA1: 20/20
OS_VA1: 20/20

## 2024-12-19 ASSESSMENT — TONOMETRY
OD_IOP_MMHG: 19
OS_IOP_MMHG: 19
OS_IOP_MMHG: 15
OD_IOP_MMHG: 14

## 2024-12-19 ASSESSMENT — LID POSITION - DERMATOCHALASIS
OS_DERMATOCHALASIS: LUL
OD_DERMATOCHALASIS: RUL

## 2024-12-19 ASSESSMENT — VISUAL ACUITY
OD_BCVA: 20/20-1
OS_BCVA: 20/20

## (undated) DEVICE — FRA-ESU BOVIE FORCE TRIAD T6D04558DX: Type: DURABLE MEDICAL EQUIPMENT

## (undated) DEVICE — S&N ARTHROCARE WAND TURBOVAC 90 DEGREE

## (undated) DEVICE — GLV 8 PROTEXIS (WHITE)

## (undated) DEVICE — GLV 7.5 PROTEXIS (WHITE)

## (undated) DEVICE — CANNULA ARTHREX CRYSTAL 5.75MMX7MM ORANGE

## (undated) DEVICE — WARMING BLANKET LOWER ADULT

## (undated) DEVICE — DRSG MASTISOL

## (undated) DEVICE — ARTHREX MULTIFIRE SCORPION NEEDLE

## (undated) DEVICE — DRAPE SPLIT SHEET 77" X 120"

## (undated) DEVICE — POSITIONER S&N SPIDER STABILIZATION KIT SHOULDER

## (undated) DEVICE — TUBING SUCTION NONCONDUCTIVE 6MM X 12FT

## (undated) DEVICE — NDL SPINAL 18G X 3.5" (PINK)

## (undated) DEVICE — DRAPE STICKY U BLUE 60 X 84"

## (undated) DEVICE — PREP CHLORAPREP HI-LITE ORANGE 26ML

## (undated) DEVICE — PACK KNEE ARTHROSCOPY

## (undated) DEVICE — VENODYNE/SCD SLEEVE CALF MEDIUM

## (undated) DEVICE — CANNULA ARTHREX TWIST IN NO SQUIRT CAP 7X7 PURPLE

## (undated) DEVICE — TUBING LINVATEC ARTHROSCOPY IN/OUTFLOW

## (undated) DEVICE — SUT MONOCRYL 4-0 27" PS-2 UNDYED

## (undated) DEVICE — SUT PDS II 0 27" CT-2

## (undated) DEVICE — SHAVER BLADE S&N INCISOR PLUS PLATINUM 4.5MM

## (undated) DEVICE — DRSG STERISTRIPS 0.5 X 4"

## (undated) DEVICE — SOL IRR LR 3000ML

## (undated) DEVICE — SUT MONOCRYL 3-0 18" PS-2 UNDYED